# Patient Record
Sex: FEMALE | URBAN - METROPOLITAN AREA
[De-identification: names, ages, dates, MRNs, and addresses within clinical notes are randomized per-mention and may not be internally consistent; named-entity substitution may affect disease eponyms.]

---

## 2024-02-21 ENCOUNTER — EMERGENCY (EMERGENCY)
Facility: HOSPITAL | Age: 59
LOS: 1 days | Discharge: HOME | End: 2024-02-21
Attending: EMERGENCY MEDICINE
Payer: COMMERCIAL

## 2024-02-21 VITALS
SYSTOLIC BLOOD PRESSURE: 159 MMHG | HEART RATE: 61 BPM | RESPIRATION RATE: 18 BRPM | OXYGEN SATURATION: 97 % | DIASTOLIC BLOOD PRESSURE: 90 MMHG

## 2024-02-21 VITALS
SYSTOLIC BLOOD PRESSURE: 183 MMHG | RESPIRATION RATE: 16 BRPM | DIASTOLIC BLOOD PRESSURE: 111 MMHG | HEART RATE: 71 BPM | WEIGHT: 154.98 LBS | TEMPERATURE: 98 F | OXYGEN SATURATION: 98 % | HEIGHT: 63 IN

## 2024-02-21 LAB
ALBUMIN SERPL ELPH-MCNC: 3.6 G/DL — SIGNIFICANT CHANGE UP (ref 3.5–5)
ALP SERPL-CCNC: 83 U/L — SIGNIFICANT CHANGE UP (ref 40–120)
ALT FLD-CCNC: 18 U/L DA — SIGNIFICANT CHANGE UP (ref 10–60)
ANION GAP SERPL CALC-SCNC: 5 MMOL/L — SIGNIFICANT CHANGE UP (ref 5–17)
AST SERPL-CCNC: 15 U/L — SIGNIFICANT CHANGE UP (ref 10–40)
BASOPHILS # BLD AUTO: 0.09 K/UL — SIGNIFICANT CHANGE UP (ref 0–0.2)
BASOPHILS NFR BLD AUTO: 0.9 % — SIGNIFICANT CHANGE UP (ref 0–2)
BILIRUB SERPL-MCNC: 0.4 MG/DL — SIGNIFICANT CHANGE UP (ref 0.2–1.2)
BUN SERPL-MCNC: 21 MG/DL — HIGH (ref 7–18)
CALCIUM SERPL-MCNC: 9.1 MG/DL — SIGNIFICANT CHANGE UP (ref 8.4–10.5)
CHLORIDE SERPL-SCNC: 110 MMOL/L — HIGH (ref 96–108)
CO2 SERPL-SCNC: 22 MMOL/L — SIGNIFICANT CHANGE UP (ref 22–31)
CREAT SERPL-MCNC: 1.1 MG/DL — SIGNIFICANT CHANGE UP (ref 0.5–1.3)
EGFR: 59 ML/MIN/1.73M2 — LOW
EOSINOPHIL # BLD AUTO: 0.29 K/UL — SIGNIFICANT CHANGE UP (ref 0–0.5)
EOSINOPHIL NFR BLD AUTO: 2.7 % — SIGNIFICANT CHANGE UP (ref 0–6)
GLUCOSE SERPL-MCNC: 102 MG/DL — HIGH (ref 70–99)
HCT VFR BLD CALC: 43.8 % — SIGNIFICANT CHANGE UP (ref 34.5–45)
HGB BLD-MCNC: 14.6 G/DL — SIGNIFICANT CHANGE UP (ref 11.5–15.5)
IMM GRANULOCYTES NFR BLD AUTO: 0.5 % — SIGNIFICANT CHANGE UP (ref 0–0.9)
LYMPHOCYTES # BLD AUTO: 2.74 K/UL — SIGNIFICANT CHANGE UP (ref 1–3.3)
LYMPHOCYTES # BLD AUTO: 25.9 % — SIGNIFICANT CHANGE UP (ref 13–44)
MCHC RBC-ENTMCNC: 32.4 PG — SIGNIFICANT CHANGE UP (ref 27–34)
MCHC RBC-ENTMCNC: 33.3 GM/DL — SIGNIFICANT CHANGE UP (ref 32–36)
MCV RBC AUTO: 97.3 FL — SIGNIFICANT CHANGE UP (ref 80–100)
MONOCYTES # BLD AUTO: 0.8 K/UL — SIGNIFICANT CHANGE UP (ref 0–0.9)
MONOCYTES NFR BLD AUTO: 7.6 % — SIGNIFICANT CHANGE UP (ref 2–14)
NEUTROPHILS # BLD AUTO: 6.6 K/UL — SIGNIFICANT CHANGE UP (ref 1.8–7.4)
NEUTROPHILS NFR BLD AUTO: 62.4 % — SIGNIFICANT CHANGE UP (ref 43–77)
NRBC # BLD: 0 /100 WBCS — SIGNIFICANT CHANGE UP (ref 0–0)
PLATELET # BLD AUTO: 244 K/UL — SIGNIFICANT CHANGE UP (ref 150–400)
POTASSIUM SERPL-MCNC: 4.1 MMOL/L — SIGNIFICANT CHANGE UP (ref 3.5–5.3)
POTASSIUM SERPL-SCNC: 4.1 MMOL/L — SIGNIFICANT CHANGE UP (ref 3.5–5.3)
PROT SERPL-MCNC: 7.3 G/DL — SIGNIFICANT CHANGE UP (ref 6–8.3)
RBC # BLD: 4.5 M/UL — SIGNIFICANT CHANGE UP (ref 3.8–5.2)
RBC # FLD: 13.7 % — SIGNIFICANT CHANGE UP (ref 10.3–14.5)
SODIUM SERPL-SCNC: 137 MMOL/L — SIGNIFICANT CHANGE UP (ref 135–145)
TROPONIN I, HIGH SENSITIVITY RESULT: 5.1 NG/L — SIGNIFICANT CHANGE UP
WBC # BLD: 10.57 K/UL — HIGH (ref 3.8–10.5)
WBC # FLD AUTO: 10.57 K/UL — HIGH (ref 3.8–10.5)

## 2024-02-21 PROCEDURE — 93005 ELECTROCARDIOGRAM TRACING: CPT

## 2024-02-21 PROCEDURE — 84484 ASSAY OF TROPONIN QUANT: CPT

## 2024-02-21 PROCEDURE — 99283 EMERGENCY DEPT VISIT LOW MDM: CPT

## 2024-02-21 PROCEDURE — 80053 COMPREHEN METABOLIC PANEL: CPT

## 2024-02-21 PROCEDURE — 99285 EMERGENCY DEPT VISIT HI MDM: CPT

## 2024-02-21 PROCEDURE — 85025 COMPLETE CBC W/AUTO DIFF WBC: CPT

## 2024-02-21 PROCEDURE — 36415 COLL VENOUS BLD VENIPUNCTURE: CPT

## 2024-02-21 RX ORDER — SODIUM CHLORIDE 9 MG/ML
1000 INJECTION INTRAMUSCULAR; INTRAVENOUS; SUBCUTANEOUS ONCE
Refills: 0 | Status: COMPLETED | OUTPATIENT
Start: 2024-02-21 | End: 2024-02-21

## 2024-02-21 RX ADMIN — SODIUM CHLORIDE 1000 MILLILITER(S): 9 INJECTION INTRAMUSCULAR; INTRAVENOUS; SUBCUTANEOUS at 21:35

## 2024-02-21 NOTE — ED PROVIDER NOTE - PHYSICAL EXAMINATION
Afebrile, hemodynamically stable, saturating well on room air  NAD, well appearing, sitting comfortably in chair, no WOB, speaking full sentences  Head NCAT  EOMI, anicteric  MMM  RRR, nml S1/S2, no m/r/g  Lungs CTAB, no w/r/r  Abd soft, NT, ND, nml BS, no rebound or guarding  AAO, CN's 3-12 intact, motor 5/5 and sensation symmetric in all extremities, normal independent gait  WEBSTER spontaneously, no leg cyanosis or edema  Skin warm, well perfused, no rashes or hives

## 2024-02-21 NOTE — ED PROVIDER NOTE - NSFOLLOWUPINSTRUCTIONS_ED_ALL_ED_FT
Please follow up with your primary care doctor in 1-2 days.  Please return to the emergency department if you have pain, vision changes, chest pain, shortness of breath, vomiting, dizziness, bloody or brown urine, fever, or any other symptoms.      Hypertension, Adult    Hypertension is another name for high blood pressure. High blood pressure forces your heart to work harder to pump blood. This can cause problems over time.    There are two numbers in a blood pressure reading. There is a top number (systolic) over a bottom number (diastolic). It is best to have a blood pressure that is below 120/80. Healthy choices can help lower your blood pressure, or you may need medicine to help lower it.    What are the causes?    The cause of this condition is not known. Some conditions may be related to high blood pressure.    What increases the risk?  •Smoking.  •Having type 2 diabetes mellitus, high cholesterol, or both.  •Not getting enough exercise or physical activity.  •Being overweight.  •Having too much fat, sugar, calories, or salt (sodium) in your diet.  •Drinking too much alcohol.  •Having long-term (chronic) kidney disease.  •Having a family history of high blood pressure.  •Age. Risk increases with age.  •Race. You may be at higher risk if you are .  •Gender. Men are at higher risk than women before age 45. After age 65, women are at higher risk than men.  •Having obstructive sleep apnea.  •Stress.    What are the signs or symptoms?  •High blood pressure may not cause symptoms. Very high blood pressure (hypertensive crisis) may cause:  •Headache.  •Feelings of worry or nervousness (anxiety).  •Shortness of breath.  •Nosebleed.  •A feeling of being sick to your stomach (nausea).  •Throwing up (vomiting).  •Changes in how you see.  •Very bad chest pain.  •Seizures.    How is this treated?  •This condition is treated by making healthy lifestyle changes, such as:  •Eating healthy foods.  •Exercising more.  •Drinking less alcohol.  •Your health care provider may prescribe medicine if lifestyle changes are not enough to get your blood pressure under control, and if:  •Your top number is above 130.  •Your bottom number is above 80.  •Your personal target blood pressure may vary.    Follow these instructions at home:    Eating and drinking    •If told, follow the DASH eating plan. To follow this plan:  •Fill one half of your plate at each meal with fruits and vegetables.  •Fill one fourth of your plate at each meal with whole grains. Whole grains include whole-wheat pasta, brown rice, and whole-grain bread.  •Eat or drink low-fat dairy products, such as skim milk or low-fat yogurt.  •Fill one fourth of your plate at each meal with low-fat (lean) proteins. Low-fat proteins include fish, chicken without skin, eggs, beans, and tofu.  •Avoid fatty meat, cured and processed meat, or chicken with skin.  •Avoid pre-made or processed food.  •Eat less than 1,500 mg of salt each day.  • Do not drink alcohol if:  •Your doctor tells you not to drink.  •You are pregnant, may be pregnant, or are planning to become pregnant.  •If you drink alcohol:•Limit how much you use to:  •0–1 drink a day for women.  •0–2 drinks a day for men.  •Be aware of how much alcohol is in your drink. In the [place] one drink equals one 12 oz bottle of beer (355 mL), one 5 oz glass of wine (148 mL), or one 1½ oz glass of hard liquor (44 mL).    Lifestyle   •Work with your doctor to stay at a healthy weight or to lose weight. Ask your doctor what the best weight is for you.  •Get at least 30 minutes of exercise most days of the week. This may include walking, swimming, or biking.  •Get at least 30 minutes of exercise that strengthens your muscles (resistance exercise) at least 3 days a week. This may include lifting weights or doing Pilates.  • Do not use any products that contain nicotine or tobacco, such as cigarettes, e-cigarettes, and chewing tobacco. If you need help quitting, ask your doctor.  •Check your blood pressure at home as told by your doctor.  •Keep all follow-up visits as told by your doctor. This is important.    Medicines   •Take over-the-counter and prescription medicines only as told by your doctor. Follow directions carefully.  • Do not skip doses of blood pressure medicine. The medicine does not work as well if you skip doses. Skipping doses also puts you at risk for problems.  •Ask your doctor about side effects or reactions to medicines that you should watch for.    Contact a doctor if you:  •Think you are having a reaction to the medicine you are taking.  •Have headaches that keep coming back (recurring).  •Feel dizzy.  •Have swelling in your ankles.  •Have trouble with your vision.    Get help right away if you:  •Get a very bad headache.  •Start to feel mixed up (confused).  •Feel weak or numb.  •Feel faint.  •Have very bad pain in your:  •Chest.  •Belly (abdomen).  •Throw up more than once.  •Have trouble breathing.    Summary  •Hypertension is another name for high blood pressure.  •High blood pressure forces your heart to work harder to pump blood.  •For most people, a normal blood pressure is less than 120/80.  •Making healthy choices can help lower blood pressure. If your blood pressure does not get lower with healthy choices, you may need to take medicine.    This information is not intended to replace advice given to you by your health care provider. Make sure you discuss any questions you have with your health care provider.

## 2024-02-21 NOTE — ED PROVIDER NOTE - OBJECTIVE STATEMENT
58-year-old female with history of anxiety on Celexa and Xanax, no other past medical history of HTN, presents with HTN. She states systolic BP was noted elevated to the 140s at her routine PMD visit 1 week ago, and PMD told her to monitor at home. She states due to her anxiety, and increased stress in her life, she has been looking at the BP monitor and her BP has been rising. States she just feels her usual mild anxiety. Discussed with NP via telemetry doc today and told to come to the ED after taking a Xanax, which she took. Patient denies all other symptoms including shortness of breath, chest pain, headache, vision changes, dizziness, leg pain or swelling, urinary changes.

## 2024-02-21 NOTE — ED PROVIDER NOTE - PATIENT PORTAL LINK FT
You can access the FollowMyHealth Patient Portal offered by [facility] by registering at the following website: [email or web address]. By joining [facility] FollowMyHealth portal, you will also be able to view your health information using other applications (apps) compatible with our system.

## 2024-02-21 NOTE — ED ADULT NURSE NOTE - OBJECTIVE STATEMENT
Patient c/o elevated BP x1 week, sent by PCP for further eval. Patient reports PMH of anxiety on xanax & reports increase in stress recently. Patient denies chest pain, SOB, Dizziness, palpitation or NVD.

## 2024-02-21 NOTE — ED PROVIDER NOTE - CLINICAL SUMMARY MEDICAL DECISION MAKING FREE TEXT BOX
Patient with asymptomatic HTN. No headache or evidence of CVA. No evidence of fluid overload. No chest pain/shortness of breath and ECG/troponin _________. Patient states she thinks her blood pressure has been rising as she has been repeating it after being told the blood pressure was a little bit higher, and this may be the case and could be consistent with her HTN. Patient with asymptomatic HTN. No headache or evidence of CVA. No evidence of fluid overload. No chest pain/shortness of breath and ECG/troponin unremarkable. Patient states she thinks her blood pressure has been rising as she has been repeating it after being told the blood pressure was a little bit higher, and this may be the case and could be consistent with her HTN. BP improved significantly with observation. Patient is well appearing, NAD, afebrile, hemodynamically stable. Any available tests and studies were discussed with patient. Discharged with instructions in further symptomatic care, return precautions, and need for close PMD f/u which she already has established.

## 2024-04-18 ENCOUNTER — NON-APPOINTMENT (OUTPATIENT)
Age: 59
End: 2024-04-18

## 2024-04-19 PROBLEM — Z00.00 ENCOUNTER FOR PREVENTIVE HEALTH EXAMINATION: Status: ACTIVE | Noted: 2024-04-19
